# Patient Record
Sex: FEMALE | NOT HISPANIC OR LATINO | URBAN - NONMETROPOLITAN AREA
[De-identification: names, ages, dates, MRNs, and addresses within clinical notes are randomized per-mention and may not be internally consistent; named-entity substitution may affect disease eponyms.]

---

## 2017-07-11 ENCOUNTER — APPOINTMENT (RX ONLY)
Dept: URBAN - NONMETROPOLITAN AREA CLINIC 29 | Facility: CLINIC | Age: 77
Setting detail: DERMATOLOGY
End: 2017-07-11

## 2017-07-11 VITALS — DIASTOLIC BLOOD PRESSURE: 80 MMHG | SYSTOLIC BLOOD PRESSURE: 132 MMHG | HEIGHT: 62 IN | WEIGHT: 128 LBS

## 2017-07-11 DIAGNOSIS — L43.2 LICHENOID DRUG REACTION: ICD-10-CM

## 2017-07-11 DIAGNOSIS — L28.0 LICHEN SIMPLEX CHRONICUS: ICD-10-CM

## 2017-07-11 PROBLEM — L30.9 DERMATITIS, UNSPECIFIED: Status: ACTIVE | Noted: 2017-07-11

## 2017-07-11 PROCEDURE — ? COUNSELING: TOPICAL STEROIDS

## 2017-07-11 PROCEDURE — ? COUNSELING

## 2017-07-11 PROCEDURE — 99212 OFFICE O/P EST SF 10 MIN: CPT

## 2017-07-11 PROCEDURE — ? DIAGNOSIS COMMENT

## 2017-07-11 PROCEDURE — ? PATIENT SPECIFIC COUNSELING

## 2017-07-11 NOTE — PROCEDURE: PATIENT SPECIFIC COUNSELING
Detail Level: Detailed
Discussed the above with patient and her .  I appreciate the input from Dr. Ama Astorga, who did not see this patient but discussed the medication plan with me.

## 2017-07-11 NOTE — PROCEDURE: MIPS QUALITY
Quality 130: Documentation Of Current Medications In The Medical Record: Current Medications Documented
Quality 431: Preventive Care And Screening: Unhealthy Alcohol Use - Screening: Patient screened for unhealthy alcohol use using a single question and scores less than 2 times per year
Quality 111:Pneumonia Vaccination Status For Older Adults: Pneumococcal Vaccination Previously Received
Quality 265: Biopsy Follow-Up: Biopsy results reviewed, communicated, tracked, and documented
Additional Notes: Patient will follow up with pcp for BP
Detail Level: Detailed
Quality 110: Preventive Care And Screening: Influenza Immunization: Influenza Immunization Administered during Influenza season
Quality 226: Preventive Care And Screening: Tobacco Use: Screening And Cessation Intervention: Patient screened for tobacco and never smoked
Quality 128: Preventive Care And Screening: Body Mass Index (Bmi) Screening And Follow-Up Plan: BMI is documented within normal parameters and no follow-up plan is required.
Quality 317: Preventative Care And Screening: Screening For High Blood Pressure And Follow-Up Documented: Pre-hypertensive or hypertensive blood pressure reading documented, and the indicated follow-up is documented

## 2017-07-11 NOTE — PROCEDURE: COUNSELING: TOPICAL STEROIDS
Detail Level: Zone
Topical Steroids Counseling: I discussed with the patient that prolonged use of topical steroids can result in the increased appearance of superficial blood vessels (telangiectasias), lightening (hypopigmentation) and thinning of the skin (atrophy).  Patient understands to avoid using high potency steroids in skin folds, the groin or the face.  The patient verbalized understanding of the proper use and possible adverse effects of topical steroids.  All of the patient's questions and concerns were addressed.\\n\\Matthew in [facility] called to [facility].

## 2018-10-09 ENCOUNTER — APPOINTMENT (RX ONLY)
Dept: URBAN - NONMETROPOLITAN AREA CLINIC 30 | Facility: CLINIC | Age: 78
Setting detail: DERMATOLOGY
End: 2018-10-09

## 2018-10-09 VITALS — HEIGHT: 61 IN | WEIGHT: 125 LBS

## 2018-10-09 DIAGNOSIS — L82.1 OTHER SEBORRHEIC KERATOSIS: ICD-10-CM

## 2018-10-09 PROCEDURE — 99213 OFFICE O/P EST LOW 20 MIN: CPT

## 2018-10-09 PROCEDURE — ? COUNSELING

## 2018-10-09 ASSESSMENT — LOCATION DETAILED DESCRIPTION DERM
LOCATION DETAILED: RIGHT SUPERIOR CENTRAL BUCCAL CHEEK
LOCATION DETAILED: RIGHT INFERIOR CENTRAL MALAR CHEEK
LOCATION DETAILED: RIGHT LATERAL MANDIBULAR CHEEK

## 2018-10-09 ASSESSMENT — LOCATION ZONE DERM: LOCATION ZONE: FACE

## 2018-10-09 ASSESSMENT — LOCATION SIMPLE DESCRIPTION DERM: LOCATION SIMPLE: RIGHT CHEEK

## 2018-10-09 NOTE — PROCEDURE: MIPS QUALITY
Quality 317: Preventative Care And Screening: Screening For High Blood Pressure And Follow-Up Documented: Pre-hypertensive or hypertensive blood pressure reading documented, and the indicated follow-up is documented
Quality 110: Preventive Care And Screening: Influenza Immunization: Influenza Immunization Administered during Influenza season
Additional Notes: Patient will follow up with pcp for BP
Quality 431: Preventive Care And Screening: Unhealthy Alcohol Use - Screening: Patient screened for unhealthy alcohol use using a single question and scores less than 2 times per year
Quality 130: Documentation Of Current Medications In The Medical Record: Current Medications Documented
Quality 226: Preventive Care And Screening: Tobacco Use: Screening And Cessation Intervention: Patient screened for tobacco and never smoked
Quality 265: Biopsy Follow-Up: Biopsy results reviewed, communicated, tracked, and documented
Quality 128: Preventive Care And Screening: Body Mass Index (Bmi) Screening And Follow-Up Plan: BMI is documented within normal parameters and no follow-up plan is required.
Quality 111:Pneumonia Vaccination Status For Older Adults: Pneumococcal Vaccination Previously Received
Detail Level: Detailed

## 2022-11-09 NOTE — HPI: SKIN LESION
Date of Service: 11/09/2022    REASON FOR VISIT:   Followup evaluation of ventricular tachycardia.    HISTORY OF PRESENT ILLNESS:   The patient is a 70-year-old female patient with history of hypertension, diabetes mellitus, idiopathic nonsustained ventricular tachycardia, monomorphic on long-term therapy with Diltiazem and Metoprolol.  She came to the office for a followup visit.  Feels well, no concern.  No chest pain, orthopnea, PND.  Obviously, because of her significant issues with her vision, she does use a walker to ambulate.  She reports no falls or blood in stool or black stool or any hospitalization.  Her most recent echo done in 03/2021 also showed normal left ventricular function and ejection fraction of 58%.  She has no specific concerns today.  She reports no palpitations.  She says she does go to exercise program 3 to 5 days a week.  Her baseline EKG done in the office shows normal sinus rhythm, right superior axis with low voltage QRS, incomplete right bundle branch block confirmed by me personally.    ALLERGIES:   Marked in Epic.    MEDICATIONS:   Marked in Epic and confirmed with the patient.    PHYSICAL EXAMINATION:   GENERAL:  Conscious, oriented, morbidly obese female patient.  VITAL SIGNS:  Heart rate of 82, blood pressure 130/84, weighs 113 kg, height is 66 inches.  NECK:  Supple, no jugular venous distention or carotid bruits.  CARDIOVASCULAR:  Revealed first and second heart sounds normally audible without pericardial rub, murmur or gallop.  LUNGS:  Clear to auscultation.  No crackles or wheeze, no adventitious breath sounds audible.  No accessory muscles of respiration noted to be used.  EXTREMITIES:  No pedal edema.  SKIN:  Warm and dry.    IMPRESSION:   1.  Idiopathic ventricular tachycardia, paroxysmal, well controlled on Diltiazem and Metoprolol.  2.  Hypertension, essential.  Continue Losartan and Hydralazine.  3.  Normal left ventricular function, ejection 58% per echo in 
What Type Of Note Output Would You Prefer (Optional)?: Standard Output
03/2021.    PLAN:   The patient advised to continue current medication and see me back in the office in 1 year's time.      Dictated By: Kyler Delacruz MD  Signing Provider: Kyler Delacruz MD    AB/pranay ([id number])   DD: 11/09/2022 11:09:22 AM TD: 11/09/2022 1:14:25 PM      
How Severe Is Your Skin Lesion?: mild
Has Your Skin Lesion Been Treated?: not been treated
Is This A New Presentation, Or A Follow-Up?: Skin Lesion